# Patient Record
Sex: FEMALE | Race: WHITE | NOT HISPANIC OR LATINO | URBAN - METROPOLITAN AREA
[De-identification: names, ages, dates, MRNs, and addresses within clinical notes are randomized per-mention and may not be internally consistent; named-entity substitution may affect disease eponyms.]

---

## 2021-01-12 ENCOUNTER — OFFICE VISIT (OUTPATIENT)
Dept: URBAN - METROPOLITAN AREA CLINIC 78 | Facility: CLINIC | Age: 41
End: 2021-01-12
Payer: COMMERCIAL

## 2021-01-12 VITALS
HEART RATE: 53 BPM | BODY MASS INDEX: 21.5 KG/M2 | DIASTOLIC BLOOD PRESSURE: 64 MMHG | TEMPERATURE: 97.5 F | HEIGHT: 70 IN | SYSTOLIC BLOOD PRESSURE: 105 MMHG | WEIGHT: 150.2 LBS

## 2021-01-12 DIAGNOSIS — Z83.79 FAMILY HISTORY OF CROHN'S DISEASE: ICD-10-CM

## 2021-01-12 DIAGNOSIS — K51.80 CHRONIC PANCOLONIC ULCERATIVE COLITIS: ICD-10-CM

## 2021-01-12 DIAGNOSIS — R14.0 BLOATING: ICD-10-CM

## 2021-01-12 DIAGNOSIS — K51.90 ULCERATIVE COLITIS: ICD-10-CM

## 2021-01-12 PROCEDURE — 99244 OFF/OP CNSLTJ NEW/EST MOD 40: CPT | Performed by: INTERNAL MEDICINE

## 2021-01-12 PROCEDURE — G8482 FLU IMMUNIZE ORDER/ADMIN: HCPCS | Performed by: INTERNAL MEDICINE

## 2021-01-12 RX ORDER — SODIUM, POTASSIUM,MAG SULFATES 17.5-3.13G
354 ML SOLUTION, RECONSTITUTED, ORAL ORAL
Qty: 1 | Refills: 0 | OUTPATIENT
Start: 2021-01-12

## 2021-01-12 NOTE — HPI-TODAY'S VISIT:
The patient was referred to us by Dr. Taylor Huffman for UC. A copy of this note will be sent to the referring physician.   The patient was initially diagnosed with UC in her early 20's. She was intially treated with high dose steroids (prednisone 60mg for several weeks) and then was not on any meds until her early 30's. In the interim, she got pregnant twice and did ok. Again in her ealry 30's, she had a "bad flare." She was treated with steroids once again and  was started on mesalamine. Since she didn't want to be on meds she decided to  "revamp her diet" and was able "to control her disease via diet." During her last pregnancy she had a bad flare with a 20lb weight loss. She was followed by [facility]. She was treated with steroids again + Lialda. She weaned herself down from 4 tablets to 1 a day until she stopped the Lialdaaltogether. She has been off meds for 2 years now. Her last colonoscopy was done 5 years ago. She has never liked any of her prior GI doctors. She has never required hospitalizations or surgery.    She is currently feeling great. She is having 1-2 non-bloody BM's daily. No abdominal pain. No tenesmus. She denies abdominal pain, constipation, diarrhea, rectal pain, anorexia or unintentional weight loss.  She avoids gluten religiously and lactose for the most part. She limits her sugar intake. She tries to avoid most grains. When she eats some of these things she does experience  bloating. She has never been tested for celiac disease.   The patient does not take blood thinners. She is only on a MVI. She has noticed that when she is under increased stress, she is more likely to get a flare. She has noticed that if she eats healthy, exercises routinely and sleeps adequately she can tend to decreased the chances of getting a flare.  Her last colonscopy was done 5 years ago. There is no FH of colon cancer or colon polyps. Her sister has Crohn's disease. She is an athlete.

## 2021-02-04 ENCOUNTER — TELEPHONE ENCOUNTER (OUTPATIENT)
Dept: URBAN - METROPOLITAN AREA CLINIC 78 | Facility: CLINIC | Age: 41
End: 2021-02-04

## 2021-02-25 LAB
ADDITIONAL INFORMATION:: (no result)
COMMENT:: (no result)
DQ2 (DQA1 0501/0505,DQB1 02XX): NEGATIVE
DQ8 (DQA1 03XX, DQB1 0302): NEGATIVE

## 2021-03-05 PROBLEM — 64766004 ULCERATIVE COLITIS: Status: ACTIVE | Noted: 2021-01-12

## 2021-03-10 ENCOUNTER — OFFICE VISIT (OUTPATIENT)
Dept: URBAN - METROPOLITAN AREA SURGERY CENTER 15 | Facility: SURGERY CENTER | Age: 41
End: 2021-03-10
Payer: COMMERCIAL

## 2021-03-10 DIAGNOSIS — K51.90 ACUTE ULCERATIVE COLITIS: ICD-10-CM

## 2021-03-10 PROCEDURE — G8907 PT DOC NO EVENTS ON DISCHARG: HCPCS | Performed by: INTERNAL MEDICINE

## 2021-03-10 PROCEDURE — 45380 COLONOSCOPY AND BIOPSY: CPT | Performed by: INTERNAL MEDICINE

## 2021-03-10 RX ORDER — SODIUM, POTASSIUM,MAG SULFATES 17.5-3.13G
354 ML SOLUTION, RECONSTITUTED, ORAL ORAL
Qty: 1 | Refills: 0 | Status: ACTIVE | COMMUNITY
Start: 2021-01-12

## 2023-01-19 ENCOUNTER — OFFICE VISIT (OUTPATIENT)
Dept: URBAN - METROPOLITAN AREA LAB 1 | Facility: LAB | Age: 43
End: 2023-01-19

## 2023-03-14 ENCOUNTER — OFFICE VISIT (OUTPATIENT)
Dept: URBAN - METROPOLITAN AREA CLINIC 78 | Facility: CLINIC | Age: 43
End: 2023-03-14
Payer: COMMERCIAL

## 2023-03-14 ENCOUNTER — DASHBOARD ENCOUNTERS (OUTPATIENT)
Age: 43
End: 2023-03-14

## 2023-03-14 VITALS
BODY MASS INDEX: 21.05 KG/M2 | HEIGHT: 70 IN | DIASTOLIC BLOOD PRESSURE: 68 MMHG | TEMPERATURE: 98.1 F | HEART RATE: 61 BPM | WEIGHT: 147 LBS | SYSTOLIC BLOOD PRESSURE: 106 MMHG

## 2023-03-14 DIAGNOSIS — R19.8 TENESMUS: ICD-10-CM

## 2023-03-14 DIAGNOSIS — R14.0 BLOATING: ICD-10-CM

## 2023-03-14 DIAGNOSIS — R19.5 MUCUS IN STOOL: ICD-10-CM

## 2023-03-14 DIAGNOSIS — Z83.79 FAMILY HISTORY OF CROHN'S DISEASE: ICD-10-CM

## 2023-03-14 DIAGNOSIS — K51.90 ULCERATIVE COLITIS: ICD-10-CM

## 2023-03-14 DIAGNOSIS — K62.5 RECTAL BLEEDING: ICD-10-CM

## 2023-03-14 PROCEDURE — 99214 OFFICE O/P EST MOD 30 MIN: CPT | Performed by: INTERNAL MEDICINE

## 2023-03-14 RX ORDER — MESALAMINE 1.2 G/1
2 TABLETS WITH A MEAL TABLET, DELAYED RELEASE ORAL ONCE A DAY
Qty: 180 TABLET | Refills: 2 | OUTPATIENT
Start: 2023-03-14 | End: 2023-12-08

## 2023-03-14 RX ORDER — SODIUM, POTASSIUM,MAG SULFATES 17.5-3.13G
354 ML SOLUTION, RECONSTITUTED, ORAL ORAL
Qty: 1 | Refills: 0 | Status: ON HOLD | COMMUNITY
Start: 2021-01-12

## 2023-03-14 RX ORDER — MESALAMINE 4 G/60ML
AS DIRECTED ENEMA RECTAL DAILY
Qty: 21 | Refills: 1 | OUTPATIENT
Start: 2023-03-14

## 2023-03-14 RX ORDER — MESALAMINE 1.2 G/1
2 TABLETS WITH A MEAL TABLET, DELAYED RELEASE ORAL ONCE A DAY
Status: ACTIVE | COMMUNITY

## 2023-03-14 NOTE — HPI-TODAY'S VISIT:
The patient was referred to us by Dr. Taylor Huffman for UC. A copy of this note will be sent to the referring physician.   The patient was initially diagnosed with UC in her early 20's. She was intially treated with high dose steroids (prednisone 60mg for several weeks) and then was not on any meds until her early 30's. In the interim, she got pregnant twice and did ok. Again in her ealry 30's, she had a "bad flare." She was treated with steroids once again and  was started on mesalamine. Since she didn't want to be on meds she decided to  "revamp her diet" and was able "to control her disease via diet." During her last pregnancy she had a bad flare with a 20lb weight loss. She was followed by [facility]. She was treated with steroids again + Lialda. She weaned herself down from 4 tablets to 1 a day until she stopped the Lialda altogether. She has been off meds for 2 years now. She has never required hospitalizations or surgery.     She started having symptoms of a flare in December2022 which exacerbated in January 2023.  She was unable to get in to see me. Dr. Huffman was able to give her a new prescription of Lialda temporarily. She also started Rowasa but she felt this made her urgency worse.   She is now feeling slightly better. Not much in the way of abdominal pain. No tenesmus. She denies constipation and currenlty has only mild diarrhea. No rectal pain, anorexia or unintentional weight loss.  She avoids gluten religiously and lactose for the most part. She limits her sugar intake. She tries to avoid most grains. When she eats some of these things she does experience  bloating.   The patient does not take blood thinners. She is only on a MVI. She has noticed that when she is under increased stress, she is more likely to get a flare. She has noticed that if she eats healthy, exercises routinely and sleeps adequately she can tend to decreased the chances of getting a flare.  There is no FH of colon cancer or colon polyps. Her sister has Crohn's disease.  She is an athlete.  Summary of prior workup: - Col by me in 2021: Normal TI and R colon. There was some scarring along the L colon and evidence of prior disease. Biopsies from both the R and L colon were negative for any active colitis. - Labs 12/8/20: HbA1c 5.5%, free T4 1.14, TSH 1.29. UA neg. Hb 13.0, MCV 93, Plts 156. TG 40, T Cholest 147, LDL 74. BUN 19, Cr 0.81, Na 139, K 4.3, Ca 9.3, TP 7.4, Alb 4.4, TB 0.6, AP 69, AST 19, ALT 18.

## 2023-04-27 ENCOUNTER — OFFICE VISIT (OUTPATIENT)
Dept: URBAN - METROPOLITAN AREA CLINIC 78 | Facility: CLINIC | Age: 43
End: 2023-04-27

## 2024-10-18 ENCOUNTER — APPOINTMENT (RX ONLY)
Age: 44
Setting detail: DERMATOLOGY
End: 2024-10-18

## 2024-10-18 DIAGNOSIS — L82.1 OTHER SEBORRHEIC KERATOSIS: ICD-10-CM

## 2024-10-18 DIAGNOSIS — L81.4 OTHER MELANIN HYPERPIGMENTATION: ICD-10-CM

## 2024-10-18 DIAGNOSIS — D18.0 HEMANGIOMA: ICD-10-CM

## 2024-10-18 DIAGNOSIS — Z87.2 PERSONAL HISTORY OF DISEASES OF THE SKIN AND SUBCUTANEOUS TISSUE: ICD-10-CM

## 2024-10-18 DIAGNOSIS — D22 MELANOCYTIC NEVI: ICD-10-CM

## 2024-10-18 PROBLEM — D18.01 HEMANGIOMA OF SKIN AND SUBCUTANEOUS TISSUE: Status: ACTIVE | Noted: 2024-10-18

## 2024-10-18 PROBLEM — D22.5 MELANOCYTIC NEVI OF TRUNK: Status: ACTIVE | Noted: 2024-10-18

## 2024-10-18 PROCEDURE — ? OBSERVATION

## 2024-10-18 PROCEDURE — ? COUNSELING

## 2024-10-18 PROCEDURE — 99213 OFFICE O/P EST LOW 20 MIN: CPT

## 2024-10-18 PROCEDURE — ? ADDITIONAL NOTES

## 2024-10-18 ASSESSMENT — LOCATION SIMPLE DESCRIPTION DERM
LOCATION SIMPLE: RIGHT ANTERIOR NECK
LOCATION SIMPLE: ABDOMEN
LOCATION SIMPLE: CHEST

## 2024-10-18 ASSESSMENT — LOCATION ZONE DERM
LOCATION ZONE: TRUNK
LOCATION ZONE: NECK

## 2024-10-18 ASSESSMENT — LOCATION DETAILED DESCRIPTION DERM
LOCATION DETAILED: STERNUM
LOCATION DETAILED: PERIUMBILICAL SKIN
LOCATION DETAILED: RIGHT INFERIOR ANTERIOR NECK

## 2024-10-18 NOTE — PROCEDURE: ADDITIONAL NOTES
Detail Level: Simple
Render Risk Assessment In Note?: no
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 will continue to monitor)
Additional Notes: Recommended laser treatment w/ Astrin if bothersome to pt.

## 2024-10-18 NOTE — HPI: EVALUATION OF SKIN LESION(S)
Hpi Title: Evaluation of Skin Lesions
Have Your Spot(S) Been Treated In The Past?: has been treated
Family Member: Father

## 2025-05-30 ENCOUNTER — APPOINTMENT (OUTPATIENT)
Age: 45
Setting detail: DERMATOLOGY
End: 2025-05-30

## 2025-05-30 DIAGNOSIS — L82.1 OTHER SEBORRHEIC KERATOSIS: ICD-10-CM

## 2025-05-30 DIAGNOSIS — L81.4 OTHER MELANIN HYPERPIGMENTATION: ICD-10-CM

## 2025-05-30 DIAGNOSIS — D18.0 HEMANGIOMA: ICD-10-CM

## 2025-05-30 DIAGNOSIS — Z87.2 PERSONAL HISTORY OF DISEASES OF THE SKIN AND SUBCUTANEOUS TISSUE: ICD-10-CM

## 2025-05-30 DIAGNOSIS — D22 MELANOCYTIC NEVI: ICD-10-CM

## 2025-05-30 PROBLEM — D22.5 MELANOCYTIC NEVI OF TRUNK: Status: ACTIVE | Noted: 2025-05-30

## 2025-05-30 PROBLEM — D18.01 HEMANGIOMA OF SKIN AND SUBCUTANEOUS TISSUE: Status: ACTIVE | Noted: 2025-05-30

## 2025-05-30 PROBLEM — D22.62 MELANOCYTIC NEVI OF LEFT UPPER LIMB, INCLUDING SHOULDER: Status: ACTIVE | Noted: 2025-05-30

## 2025-05-30 PROCEDURE — 99213 OFFICE O/P EST LOW 20 MIN: CPT

## 2025-05-30 PROCEDURE — ? COUNSELING

## 2025-05-30 PROCEDURE — ? ADDITIONAL NOTES

## 2025-05-30 PROCEDURE — ? OBSERVATION

## 2025-05-30 ASSESSMENT — LOCATION ZONE DERM
LOCATION ZONE: TRUNK
LOCATION ZONE: ARM
LOCATION ZONE: NECK

## 2025-05-30 ASSESSMENT — LOCATION DETAILED DESCRIPTION DERM
LOCATION DETAILED: LEFT PROXIMAL POSTERIOR UPPER ARM
LOCATION DETAILED: RIGHT INFERIOR ANTERIOR NECK
LOCATION DETAILED: PERIUMBILICAL SKIN
LOCATION DETAILED: STERNUM

## 2025-05-30 ASSESSMENT — LOCATION SIMPLE DESCRIPTION DERM
LOCATION SIMPLE: RIGHT ANTERIOR NECK
LOCATION SIMPLE: ABDOMEN
LOCATION SIMPLE: CHEST
LOCATION SIMPLE: LEFT POSTERIOR UPPER ARM

## 2025-05-30 NOTE — PROCEDURE: ADDITIONAL NOTES
Detail Level: Simple
Render Risk Assessment In Note?: no
Additional Notes: bx proven mild DN, - margins on the right anterior neck 6/30/23 (re-evaluated today WITH repigmentation within original biopsy scar. will continue to monitor) No change since previous visit

## 2025-05-30 NOTE — PROCEDURE: MIPS QUALITY
Quality 431: Preventive Care And Screening: Unhealthy Alcohol Use - Screening: Patient not identified as an unhealthy alcohol user when screened for unhealthy alcohol use using a systematic screening method
Detail Level: Detailed
Quality 226: Preventive Care And Screening: Tobacco Use: Screening And Cessation Intervention: Patient screened for tobacco use and is an ex/non-smoker
Additional Notes: Patient has NOT received the [id number] COVID-19 vaccine